# Patient Record
Sex: FEMALE | Race: WHITE | NOT HISPANIC OR LATINO | URBAN - METROPOLITAN AREA
[De-identification: names, ages, dates, MRNs, and addresses within clinical notes are randomized per-mention and may not be internally consistent; named-entity substitution may affect disease eponyms.]

---

## 2021-03-01 ENCOUNTER — OFFICE VISIT (OUTPATIENT)
Dept: URBAN - METROPOLITAN AREA CLINIC 82 | Facility: CLINIC | Age: 86
End: 2021-03-01

## 2021-03-01 RX ORDER — MONTELUKAST SODIUM 10 MG/1
TAKE 1 TABLET (10 MG) BY ORAL ROUTE ONCE DAILY IN THE EVENING TABLET, FILM COATED ORAL 1
Qty: 0 | Refills: 0 | Status: ACTIVE | COMMUNITY
Start: 1900-01-01 | End: 1900-01-01

## 2021-03-01 RX ORDER — CARBOXYMETHYLCELLULOSE SODIUM 5 MG/ML
SOLUTION/ DROPS OPHTHALMIC
Qty: 1 | Refills: 0 | Status: ACTIVE | COMMUNITY
Start: 1900-01-01 | End: 1900-01-01

## 2021-03-01 RX ORDER — FLUOXETINE 20 MG/1
TAKE 1 CAPSULE (20 MG) BY ORAL ROUTE ONCE DAILY CAPSULE ORAL 1
Qty: 0 | Refills: 0 | Status: ACTIVE | COMMUNITY
Start: 1900-01-01 | End: 1900-01-01

## 2021-03-01 RX ORDER — CYCLOSPORINE 0.5 MG/ML
EMULSION OPHTHALMIC
Qty: 0 | Refills: 0 | Status: ACTIVE | COMMUNITY
Start: 1900-01-01 | End: 1900-01-01

## 2021-03-01 RX ORDER — DOXYCYCLINE 100 MG/1
TAKE 1 CAPSULE (100 MG) BY ORAL ROUTE ONCE DAILY CAPSULE ORAL 1
Qty: 0 | Refills: 0 | Status: ACTIVE | COMMUNITY
Start: 1900-01-01 | End: 1900-01-01

## 2021-03-01 RX ORDER — DILTIAZEM HYDROCHLORIDE 120 MG/1
TAKE 1 TABLET (120 MG) BY ORAL ROUTE 3 TIMES PER DAY TABLET, COATED ORAL
Qty: 0 | Refills: 0 | Status: ACTIVE | COMMUNITY
Start: 1900-01-01 | End: 1900-01-01

## 2021-03-01 RX ORDER — PANCRELIPASE 60000; 12000; 38000 [USP'U]/1; [USP'U]/1; [USP'U]/1
1 CAPSULE WITH MEALS CAPSULE, DELAYED RELEASE PELLETS ORAL
Qty: 270 | Refills: 1 | Status: ACTIVE | COMMUNITY
Start: 2017-10-02 | End: 1900-01-01

## 2021-03-01 RX ORDER — IPRATROPIUM BROMIDE AND ALBUTEROL 20; 100 UG/1; UG/1
INHALE 1 PUFF BY INHALATION ROUTE 4 TIMES PER DAY ; MAY TAKE ADDITIONAL PUFFS AS NEEDED NOT TO EXCEED 6 PUFFS IN 24HRS SPRAY, METERED RESPIRATORY (INHALATION)
Qty: 0 | Refills: 0 | Status: ACTIVE | COMMUNITY
Start: 1900-01-01 | End: 1900-01-01

## 2021-03-01 RX ORDER — FLECAINIDE ACETATE 100 MG/1
TAKE 1 TABLET (100 MG) BY ORAL ROUTE EVERY 12 HOURS TABLET ORAL 2
Qty: 0 | Refills: 0 | Status: ACTIVE | COMMUNITY
Start: 1900-01-01 | End: 1900-01-01

## 2021-03-01 RX ORDER — OMEPRAZOLE 40 MG/1
CAPSULE, DELAYED RELEASE PELLETS ORAL
Qty: 0 | Refills: 0 | Status: ACTIVE | COMMUNITY
Start: 1900-01-01 | End: 1900-01-01

## 2021-03-01 RX ORDER — FLUTICASONE PROPIONATE 50 UG/1
SPRAY, METERED NASAL
Qty: 1 | Refills: 0 | Status: ACTIVE | COMMUNITY
Start: 1900-01-01 | End: 1900-01-01

## 2021-03-01 RX ORDER — ASPIRIN 325 MG/1
TABLET ORAL
Qty: 0 | Refills: 0 | Status: ACTIVE | COMMUNITY
Start: 1900-01-01 | End: 1900-01-01

## 2021-03-01 RX ORDER — ESTRADIOL 0.07 MG/D
APPLY 1 PATCH BY TRANSDERMAL ROUTE ONCE WEEKLY PATCH TRANSDERMAL
Qty: 1 | Refills: 0 | Status: ACTIVE | COMMUNITY
Start: 1900-01-01 | End: 1900-01-01

## 2021-03-01 RX ORDER — LEVOTHYROXINE SODIUM 25 UG/1
TABLET ORAL
Qty: 0 | Refills: 0 | Status: ACTIVE | COMMUNITY
Start: 1900-01-01 | End: 1900-01-01

## 2021-03-01 RX ORDER — GABAPENTIN 100 MG/1
TAKE 3 CAPSULES (300 MG) BY ORAL ROUTE 3 TIMES PER DAY CAPSULE ORAL
Qty: 0 | Refills: 0 | Status: ACTIVE | COMMUNITY
Start: 1900-01-01 | End: 1900-01-01

## 2021-03-01 RX ORDER — ACETAMINOPHEN, ASPIRIN (NSAID) AND CAFFEINE 250; 250; 65 MG/1; MG/1; MG/1
TABLET, FILM COATED ORAL
Qty: 0 | Refills: 0 | Status: ACTIVE | COMMUNITY
Start: 1900-01-01 | End: 1900-01-01

## 2021-03-15 ENCOUNTER — LAB OUTSIDE AN ENCOUNTER (OUTPATIENT)
Dept: URBAN - METROPOLITAN AREA CLINIC 82 | Facility: CLINIC | Age: 86
End: 2021-03-15

## 2021-03-15 ENCOUNTER — OFFICE VISIT (OUTPATIENT)
Dept: URBAN - METROPOLITAN AREA CLINIC 82 | Facility: CLINIC | Age: 86
End: 2021-03-15
Payer: MEDICARE

## 2021-03-15 ENCOUNTER — DASHBOARD ENCOUNTERS (OUTPATIENT)
Age: 86
End: 2021-03-15

## 2021-03-15 DIAGNOSIS — K59.09 CHRONIC CONSTIPATION: ICD-10-CM

## 2021-03-15 DIAGNOSIS — I69.391 DYSPHAGIA AS LATE EFFECT OF CEREBROVASCULAR ACCIDENT (CVA): ICD-10-CM

## 2021-03-15 DIAGNOSIS — K21.00 GASTROESOPHAGEAL REFLUX DISEASE WITH ESOPHAGITIS, UNSPECIFIED WHETHER HEMORRHAGE: ICD-10-CM

## 2021-03-15 PROBLEM — 426033005: Status: ACTIVE | Noted: 2021-03-15

## 2021-03-15 PROBLEM — 266433003: Status: ACTIVE | Noted: 2021-03-15

## 2021-03-15 PROBLEM — 40739000: Status: ACTIVE | Noted: 2021-03-15

## 2021-03-15 PROBLEM — 236069009: Status: ACTIVE | Noted: 2021-03-15

## 2021-03-15 PROCEDURE — 99214 OFFICE O/P EST MOD 30 MIN: CPT | Performed by: INTERNAL MEDICINE

## 2021-03-15 RX ORDER — IPRATROPIUM BROMIDE AND ALBUTEROL 20; 100 UG/1; UG/1
INHALE 1 PUFF BY INHALATION ROUTE 4 TIMES PER DAY ; MAY TAKE ADDITIONAL PUFFS AS NEEDED NOT TO EXCEED 6 PUFFS IN 24HRS SPRAY, METERED RESPIRATORY (INHALATION)
Qty: 0 | Refills: 0 | Status: ACTIVE | COMMUNITY
Start: 1900-01-01

## 2021-03-15 RX ORDER — PANCRELIPASE 60000; 12000; 38000 [USP'U]/1; [USP'U]/1; [USP'U]/1
1 CAPSULE WITH MEALS CAPSULE, DELAYED RELEASE PELLETS ORAL
OUTPATIENT
Start: 2017-10-02

## 2021-03-15 RX ORDER — ESTRADIOL 0.07 MG/D
APPLY 1 PATCH BY TRANSDERMAL ROUTE ONCE WEEKLY PATCH TRANSDERMAL
Qty: 1 | Refills: 0 | Status: ACTIVE | COMMUNITY
Start: 1900-01-01

## 2021-03-15 RX ORDER — FLECAINIDE ACETATE 100 MG/1
TAKE 1 TABLET (100 MG) BY ORAL ROUTE EVERY 12 HOURS TABLET ORAL 2
Qty: 0 | Refills: 0 | Status: ACTIVE | COMMUNITY
Start: 1900-01-01

## 2021-03-15 RX ORDER — ASPIRIN 325 MG/1
TABLET ORAL
Qty: 0 | Refills: 0 | Status: ACTIVE | COMMUNITY
Start: 1900-01-01

## 2021-03-15 RX ORDER — DOXYCYCLINE 100 MG/1
TAKE 1 CAPSULE (100 MG) BY ORAL ROUTE ONCE DAILY CAPSULE ORAL 1
Qty: 0 | Refills: 0 | Status: ACTIVE | COMMUNITY
Start: 1900-01-01

## 2021-03-15 RX ORDER — GABAPENTIN 100 MG/1
TAKE 3 CAPSULES (300 MG) BY ORAL ROUTE 3 TIMES PER DAY CAPSULE ORAL
Qty: 0 | Refills: 0 | Status: ACTIVE | COMMUNITY
Start: 1900-01-01

## 2021-03-15 RX ORDER — ACETAMINOPHEN, ASPIRIN (NSAID) AND CAFFEINE 250; 250; 65 MG/1; MG/1; MG/1
TABLET, FILM COATED ORAL
Qty: 0 | Refills: 0 | Status: ACTIVE | COMMUNITY
Start: 1900-01-01

## 2021-03-15 RX ORDER — FLUOXETINE 20 MG/1
TAKE 1 CAPSULE (20 MG) BY ORAL ROUTE ONCE DAILY CAPSULE ORAL 1
Qty: 0 | Refills: 0 | Status: ACTIVE | COMMUNITY
Start: 1900-01-01

## 2021-03-15 RX ORDER — LINACLOTIDE 145 UG/1
1 CAPSULE AT LEAST 30 MINUTES BEFORE THE FIRST MEAL OF THE DAY ON AN EMPTY STOMACH CAPSULE, GELATIN COATED ORAL ONCE A DAY
Qty: 90 CAPSULE | Refills: 0 | OUTPATIENT
Start: 2021-03-16 | End: 2021-06-14

## 2021-03-15 RX ORDER — DILTIAZEM HYDROCHLORIDE 120 MG/1
TAKE 1 TABLET (120 MG) BY ORAL ROUTE 3 TIMES PER DAY TABLET, COATED ORAL
Qty: 0 | Refills: 0 | Status: ACTIVE | COMMUNITY
Start: 1900-01-01

## 2021-03-15 RX ORDER — FLUTICASONE PROPIONATE 50 UG/1
SPRAY, METERED NASAL
Qty: 1 | Refills: 0 | Status: ACTIVE | COMMUNITY
Start: 1900-01-01

## 2021-03-15 RX ORDER — LEVOTHYROXINE SODIUM 25 UG/1
TABLET ORAL
Qty: 0 | Refills: 0 | Status: ACTIVE | COMMUNITY
Start: 1900-01-01

## 2021-03-15 RX ORDER — CYCLOSPORINE 0.5 MG/ML
EMULSION OPHTHALMIC
Qty: 0 | Refills: 0 | Status: ACTIVE | COMMUNITY
Start: 1900-01-01

## 2021-03-15 RX ORDER — PANTOPRAZOLE 40 MG/1
1 TABLET TABLET, DELAYED RELEASE ORAL ONCE A DAY
Qty: 90 TABLET | Refills: 1 | OUTPATIENT
Start: 2021-03-16

## 2021-03-15 RX ORDER — PANCRELIPASE 60000; 12000; 38000 [USP'U]/1; [USP'U]/1; [USP'U]/1
1 CAPSULE WITH MEALS CAPSULE, DELAYED RELEASE PELLETS ORAL
Qty: 270 | Refills: 1 | Status: ON HOLD | COMMUNITY
Start: 2017-10-02

## 2021-03-15 RX ORDER — OMEPRAZOLE 40 MG/1
CAPSULE, DELAYED RELEASE PELLETS ORAL
Qty: 0 | Refills: 0 | Status: ACTIVE | COMMUNITY
Start: 1900-01-01

## 2021-03-15 RX ORDER — CARBOXYMETHYLCELLULOSE SODIUM 5 MG/ML
SOLUTION/ DROPS OPHTHALMIC
Qty: 1 | Refills: 0 | Status: ACTIVE | COMMUNITY
Start: 1900-01-01

## 2021-03-15 RX ORDER — MONTELUKAST SODIUM 10 MG/1
TAKE 1 TABLET (10 MG) BY ORAL ROUTE ONCE DAILY IN THE EVENING TABLET, FILM COATED ORAL 1
Qty: 0 | Refills: 0 | Status: ACTIVE | COMMUNITY
Start: 1900-01-01

## 2021-03-15 NOTE — PHYSICAL EXAM SKIN:
no rashes, no suspicious lesions, dark discoloration left ankle  no jaundice present, good turgor, no masses, no tenderness on palpation

## 2021-03-15 NOTE — HPI-TODAY'S VISIT:
Kassandra Davidson is a 86-year-old  female known to me for years with a history of chronic acid reflux as well as chronic constipation secondary to the pelvic floor dysfunction here today for the complaints of having dysphagia.  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  Patient is accompanied by her  who also endorses a similar symptoms.  Here.  Her dysphagia is more when she eating both solids and liquids.  She feels like it is going into the wrong direction causing her coughing spells.  She also complains of having fatigue tiredness weakness weight loss over the year over the past year.  She continues to decline overall health.  She is also been on multiple medications.  She is on Eliquis for history of A. fib and stroke.  Denies any chest pain however she complaining of having shortness of breath and exertional dyspnea.  She also reports having constipation which is poorly controlled because she is not taking any Linzess at this time.  She reports having digitalization to remove to get the relief from the constipation.  She is currently on MiraLAX once daily.

## 2021-04-08 ENCOUNTER — TELEPHONE ENCOUNTER (OUTPATIENT)
Dept: URBAN - METROPOLITAN AREA SURGERY CENTER 30 | Facility: SURGERY CENTER | Age: 86
End: 2021-04-08